# Patient Record
Sex: FEMALE | Race: WHITE | NOT HISPANIC OR LATINO | Employment: FULL TIME | URBAN - METROPOLITAN AREA
[De-identification: names, ages, dates, MRNs, and addresses within clinical notes are randomized per-mention and may not be internally consistent; named-entity substitution may affect disease eponyms.]

---

## 2022-05-17 ENCOUNTER — TELEPHONE (OUTPATIENT)
Dept: ENDOCRINOLOGY | Facility: CLINIC | Age: 42
End: 2022-05-17

## 2022-05-17 ENCOUNTER — LAB (OUTPATIENT)
Dept: LAB | Facility: HOSPITAL | Age: 42
End: 2022-05-17

## 2022-05-17 ENCOUNTER — OFFICE VISIT (OUTPATIENT)
Dept: ENDOCRINOLOGY | Facility: CLINIC | Age: 42
End: 2022-05-17

## 2022-05-17 VITALS
OXYGEN SATURATION: 97 % | WEIGHT: 293 LBS | BODY MASS INDEX: 48.82 KG/M2 | DIASTOLIC BLOOD PRESSURE: 77 MMHG | SYSTOLIC BLOOD PRESSURE: 128 MMHG | HEIGHT: 65 IN | HEART RATE: 82 BPM

## 2022-05-17 DIAGNOSIS — E66.01 CLASS 3 SEVERE OBESITY DUE TO EXCESS CALORIES WITH SERIOUS COMORBIDITY AND BODY MASS INDEX (BMI) OF 50.0 TO 59.9 IN ADULT: ICD-10-CM

## 2022-05-17 DIAGNOSIS — E03.9 ACQUIRED HYPOTHYROIDISM: ICD-10-CM

## 2022-05-17 DIAGNOSIS — E11.65 TYPE 2 DIABETES MELLITUS WITH HYPERGLYCEMIA, WITHOUT LONG-TERM CURRENT USE OF INSULIN: Primary | ICD-10-CM

## 2022-05-17 PROBLEM — G47.33 OSA (OBSTRUCTIVE SLEEP APNEA): Status: ACTIVE | Noted: 2021-08-31

## 2022-05-17 PROBLEM — E28.2 PCOS (POLYCYSTIC OVARIAN SYNDROME): Status: ACTIVE | Noted: 2022-05-17

## 2022-05-17 PROBLEM — I10 ESSENTIAL HYPERTENSION: Status: ACTIVE | Noted: 2021-08-31

## 2022-05-17 PROBLEM — E66.813 CLASS 3 SEVERE OBESITY DUE TO EXCESS CALORIES WITH SERIOUS COMORBIDITY AND BODY MASS INDEX (BMI) OF 50.0 TO 59.9 IN ADULT: Status: ACTIVE | Noted: 2021-08-31

## 2022-05-17 LAB
ALBUMIN UR-MCNC: 2.4 MG/DL
CREAT UR-MCNC: 82.2 MG/DL
EXPIRATION DATE: ABNORMAL
EXPIRATION DATE: NORMAL
GLUCOSE BLDC GLUCOMTR-MCNC: 200 MG/DL (ref 70–130)
HBA1C MFR BLD: 6.5 %
HBA1C MFR BLD: 6.9 %
Lab: ABNORMAL
Lab: NORMAL
MICROALBUMIN/CREAT UR: 29.2 MG/G

## 2022-05-17 PROCEDURE — 82947 ASSAY GLUCOSE BLOOD QUANT: CPT | Performed by: INTERNAL MEDICINE

## 2022-05-17 PROCEDURE — 82570 ASSAY OF URINE CREATININE: CPT | Performed by: INTERNAL MEDICINE

## 2022-05-17 PROCEDURE — 83036 HEMOGLOBIN GLYCOSYLATED A1C: CPT | Performed by: INTERNAL MEDICINE

## 2022-05-17 PROCEDURE — 82043 UR ALBUMIN QUANTITATIVE: CPT | Performed by: INTERNAL MEDICINE

## 2022-05-17 PROCEDURE — 99244 OFF/OP CNSLTJ NEW/EST MOD 40: CPT | Performed by: INTERNAL MEDICINE

## 2022-05-17 RX ORDER — LEVOTHYROXINE SODIUM 0.07 MG/1
75 TABLET ORAL DAILY
COMMUNITY

## 2022-05-17 RX ORDER — FOLIC ACID 1 MG/1
1 TABLET ORAL DAILY
COMMUNITY

## 2022-05-17 RX ORDER — METFORMIN HYDROCHLORIDE 500 MG/1
1000 TABLET, EXTENDED RELEASE ORAL
Qty: 180 TABLET | Refills: 1 | Status: SHIPPED | OUTPATIENT
Start: 2022-05-17 | End: 2022-09-19 | Stop reason: SDUPTHER

## 2022-05-17 RX ORDER — HYDROCHLOROTHIAZIDE 12.5 MG/1
TABLET ORAL
COMMUNITY

## 2022-05-17 RX ORDER — ERGOCALCIFEROL (VITAMIN D2) 10 MCG
400 TABLET ORAL DAILY
COMMUNITY

## 2022-05-17 RX ORDER — METFORMIN HYDROCHLORIDE 500 MG/1
500 TABLET, EXTENDED RELEASE ORAL 2 TIMES DAILY
COMMUNITY
Start: 2022-03-08 | End: 2022-05-17 | Stop reason: SDUPTHER

## 2022-05-17 RX ORDER — RIZATRIPTAN BENZOATE 10 MG/1
10 TABLET ORAL ONCE AS NEEDED
COMMUNITY

## 2022-05-17 RX ORDER — FLUOXETINE HYDROCHLORIDE 40 MG/1
CAPSULE ORAL
COMMUNITY

## 2022-05-17 RX ORDER — LISINOPRIL 30 MG/1
30 TABLET ORAL DAILY
COMMUNITY
Start: 2022-03-08

## 2022-05-17 NOTE — PROGRESS NOTES
Chief Complaint   Patient presents with   • Diabetes        Referring Provider  Lori Hicks, APRN     HPI   Ann Myles is a 41 y.o. female had concerns including Diabetes.    Diabetes was diagnosed recently.  PCOS was diagnosed 2012/2013. Was on metformin for that.  Complications include none.  Last ophtho exam was last year - within a year - Dr. Lloyd in [place].  Current medications for diabetes include metformin 500 mg once daily. Prescribed twice daily but taking only once.  She checks her blood sugar 0 times per day. Has an aversion to pricking her fingers. Insurance coverage isn't great - thinks CGM may not be covered.    Diet:  Breakfast: biscuit and gravy, hashbrowns and eggs, sausage biscuit - works at [employer]  Lunch: cheeseburger, fries  Dinner: eating out often   Drinks: coke zero, at work drinks regular soda - sprite, root beer, or sweet tea  Snacks: fries, chicken strip on occasion, sweets on occasion    Patient also has hypothyroidism and is on levothyroxine 75 mcg daily. Dose was increased earlier this year. PCP was planning to recheck in August.     Snores at night. Hasn't been screened for sleep apnea.   Notes fatigue. Works a lot.     Hasn't met with a nutritionist.     Past Medical History:   Diagnosis Date   • Class 3 severe obesity due to excess calories with serious comorbidity and body mass index (BMI) of 50.0 to 59.9 in adult (HCC)    • Hypothyroidism    • Migraine    • PCOS (polycystic ovarian syndrome)    • Type 2 diabetes mellitus (HCC)      Past Surgical History:   Procedure Laterality Date   • DILATATION AND CURETTAGE     • TUBAL ABDOMINAL LIGATION        Family History   Problem Relation Age of Onset   • Thyroid disease Mother    • Cancer Mother    • Stroke Mother    • Hypertension Father    • Hypertension Maternal Grandmother    • Heart disease Maternal Grandfather    • Hypertension Maternal Grandfather    • Hypertension Paternal Grandmother    • Heart disease Paternal  Grandfather    • Hypertension Paternal Grandfather       Social History     Socioeconomic History   • Marital status: Single   Tobacco Use   • Smoking status: Former Smoker   • Smokeless tobacco: Never Used   Vaping Use   • Vaping Use: Never used   Substance and Sexual Activity   • Alcohol use: Never   • Drug use: Never   • Sexual activity: Defer      Allergies   Allergen Reactions   • Soy Allergy Swelling      Current Outpatient Medications on File Prior to Visit   Medication Sig Dispense Refill   • Cyanocobalamin (Vitamin B 12) 100 MCG lozenge Take  by mouth.     • FLUoxetine (PROzac) 40 MG capsule fluoxetine 40 mg capsule   Take 1 capsule every day by oral route.     • folic acid (FOLVITE) 1 MG tablet Take 1 mg by mouth Daily.     • hydroCHLOROthiazide (HYDRODIURIL) 12.5 MG tablet hydrochlorothiazide 12.5 mg tablet   Take 1 tablet every day by oral route.     • levothyroxine (SYNTHROID, LEVOTHROID) 75 MCG tablet Take 75 mcg by mouth Daily.     • lisinopril (PRINIVIL,ZESTRIL) 30 MG tablet Take 30 mg by mouth Daily.     • rizatriptan (MAXALT) 10 MG tablet Take 10 mg by mouth 1 (One) Time As Needed for Migraine. May repeat in 2 hours if needed     • thiamine (VITAMIN B1) 250 MG tablet Take 250 mg by mouth Daily.     • topiramate (TOPAMAX) 200 MG tablet Take 500 mg by mouth.     • Vitamin D, Cholecalciferol, (CHOLECALCIFEROL) 10 MCG (400 UNIT) tablet Take 400 Units by mouth Daily.     • [DISCONTINUED] metFORMIN ER (GLUCOPHAGE-XR) 500 MG 24 hr tablet Take 500 mg by mouth 2 (Two) Times a Day.       No current facility-administered medications on file prior to visit.        Review of Systems   Constitutional: Positive for fatigue.   HENT: Negative.    Eyes: Negative.    Respiratory: Negative.    Cardiovascular: Negative.    Gastrointestinal: Negative.    Endocrine: Negative.    Genitourinary: Negative.    Musculoskeletal: Negative.    Skin: Negative.    Allergic/Immunologic: Negative.    Neurological: Negative.   "  Hematological: Negative.    Psychiatric/Behavioral: Negative.         /77   Pulse 82   Ht 165.1 cm (65\")   Wt (!) 163 kg (360 lb)   SpO2 97%   BMI 59.91 kg/m²      Physical Exam  Cardiovascular:      Pulses:           Dorsalis pedis pulses are 2+ on the right side and 2+ on the left side.   Feet:      Right foot:      Skin integrity: Skin integrity normal.      Toenail Condition: Right toenails are normal.      Left foot:      Skin integrity: Skin integrity normal.      Toenail Condition: Left toenails are normal.      Comments: Diabetic Foot Exam Performed and Monofilament Test Performed    Monofilament 5/5 bilaterally          Constitutional:  well developed; well nourished  no acute distress  obese - Body mass index is 59.91 kg/m².   ENT/Thyroid: no thyromegaly  no palpable nodules   Eyes: EOM intact  Conjunctiva: clear   Respiratory:  breathing is unlabored  clear to auscultation bilaterally   Cardiovascular:  regular rate and rhythm, S1, S2 normal, no murmur, click, rub or gallop   Chest:  Not performed.   Abdomen: Not performed.   : Not performed.   Musculoskeletal: negative findings:  ROM of all joints is normal, no deformities present   Skin: dry and warm   Neuro: normal without focal findings and mental status, speech normal, alert and oriented x3   Psych: oriented to time, place and person, mood and affect are within normal limits     CMP:  Lab Results   Component Value Date     (H) 08/31/2021    BUN 17 08/31/2021    CREATININE 1.02 08/31/2021    EGFRIFNONA 59 (L) 08/31/2021    EGFRIFAFRI >60 08/31/2021    BCR 17 08/31/2021     08/31/2021    K 4.8 08/31/2021    CO2 24 08/31/2021    CALCIUM 9.3 08/31/2021     HbA1c:  Lab Results   Component Value Date    HGBA1C 6.9 05/17/2022     Glucose:  Lab Results   Component Value Date    POCGLU 200 (A) 05/17/2022     Assessment and Plan    Diagnoses and all orders for this visit:    1. Type 2 diabetes mellitus with hyperglycemia, without " long-term current use of insulin ([facility]) (Primary)  Uncontrolled with hyperglycemia.  A1c has trended up to 6.9.  Increase metformin to 1000 mg once daily in the AM.  Dietary modifications are necessary and were discussed.  Eliminate regular sweetened beverages, decrease intake of fast food.  Discussed the option to consider GLP-1 agonists though patient's insurance coverage is inadequate and she has much room for improvement in lifestyle.  Will defer at this time.  Labs requested from PCP and referring provider.  Monofilament was checked today and was normal.  Ophtho exam updated yearly and patient will request the report be sent here.  -     POC Glucose, Blood  -     POC Glycosylated Hemoglobin (Hb A1C)  -     metFORMIN ER (GLUCOPHAGE-XR) 500 MG 24 hr tablet; Take 2 tablets by mouth Daily With Breakfast.  Dispense: 180 tablet; Refill: 1  -     Microalbumin / Creatinine Urine Ratio - Urine, Clean Catch    2. Class 3 severe obesity due to excess calories with serious comorbidity and body mass index (BMI) of 50.0 to 59.9 in adult ([facility])  Weight loss is necessary for overall health and diabetes management.  Current BMI 59.9.  Discussed dietary modifications as above a nutritionist was consulted.  -     Ambulatory Referral to Nutrition Services    3. Acquired hypothyroidism  Clinically euthyroid on levothyroxine 75 mcg daily.  Requested labs from PCPs office for review.  Dose was increased earlier this year and will have repeat levels in August.      Return in about 4 months (around 9/17/2022) for next scheduled follow up. The patient was instructed to contact the clinic with any interval questions or concerns.    Marguerite Farah, DO   Endocrinologist    Please note that portions of this note were completed with a voice recognition program.

## 2022-05-18 DIAGNOSIS — E66.01 CLASS 3 SEVERE OBESITY DUE TO EXCESS CALORIES WITH SERIOUS COMORBIDITY AND BODY MASS INDEX (BMI) OF 50.0 TO 59.9 IN ADULT: ICD-10-CM

## 2022-05-18 DIAGNOSIS — E11.65 TYPE 2 DIABETES MELLITUS WITH HYPERGLYCEMIA, WITHOUT LONG-TERM CURRENT USE OF INSULIN: Primary | ICD-10-CM

## 2022-09-19 ENCOUNTER — OFFICE VISIT (OUTPATIENT)
Dept: ENDOCRINOLOGY | Facility: CLINIC | Age: 42
End: 2022-09-19

## 2022-09-19 VITALS
WEIGHT: 293 LBS | HEIGHT: 65 IN | OXYGEN SATURATION: 97 % | HEART RATE: 87 BPM | BODY MASS INDEX: 48.82 KG/M2 | SYSTOLIC BLOOD PRESSURE: 124 MMHG | DIASTOLIC BLOOD PRESSURE: 79 MMHG

## 2022-09-19 DIAGNOSIS — E11.65 TYPE 2 DIABETES MELLITUS WITH HYPERGLYCEMIA, WITHOUT LONG-TERM CURRENT USE OF INSULIN: Primary | ICD-10-CM

## 2022-09-19 DIAGNOSIS — E66.01 CLASS 3 SEVERE OBESITY DUE TO EXCESS CALORIES WITH SERIOUS COMORBIDITY AND BODY MASS INDEX (BMI) OF 50.0 TO 59.9 IN ADULT: ICD-10-CM

## 2022-09-19 LAB
EXPIRATION DATE: NORMAL
EXPIRATION DATE: NORMAL
GLUCOSE BLDC GLUCOMTR-MCNC: 130 MG/DL (ref 70–130)
HBA1C MFR BLD: 6.7 %
Lab: NORMAL
Lab: NORMAL

## 2022-09-19 PROCEDURE — 82947 ASSAY GLUCOSE BLOOD QUANT: CPT | Performed by: INTERNAL MEDICINE

## 2022-09-19 PROCEDURE — 83036 HEMOGLOBIN GLYCOSYLATED A1C: CPT | Performed by: INTERNAL MEDICINE

## 2022-09-19 PROCEDURE — 3044F HG A1C LEVEL LT 7.0%: CPT | Performed by: INTERNAL MEDICINE

## 2022-09-19 PROCEDURE — 99214 OFFICE O/P EST MOD 30 MIN: CPT | Performed by: INTERNAL MEDICINE

## 2022-09-19 RX ORDER — METFORMIN HYDROCHLORIDE 500 MG/1
1000 TABLET, EXTENDED RELEASE ORAL
Qty: 180 TABLET | Refills: 1 | Status: SHIPPED | OUTPATIENT
Start: 2022-09-19

## 2022-09-19 NOTE — PROGRESS NOTES
"Chief Complaint   Patient presents with   • Diabetes          HPI   Ann Myles is a 42 y.o. female had concerns including Diabetes.    She is checking blood sugars 0 times per day.  Current medications for diabetes include metformin 1000 mg daily in the morning. Tolerating without issue.     Has changed her diet - eating less fast food, no regular soda. All diet sodas now.     Is getting > 10,000 steps per day.     Is due for labs. Is between insurances so pending labs once insurance is started.    Is on levothyroxine 75 mcg daily.     The following portions of the patient's history were reviewed and updated as appropriate: allergies, current medications, past family history, past medical history, past social history, past surgical history and problem list.      Review of Systems   Constitutional: Positive for activity change and appetite change.   Endocrine: Negative.         Physical Exam  Vitals reviewed.   Constitutional:       Appearance: Normal appearance. She is obese.      Comments: Body mass index is 57.91 kg/m².   Cardiovascular:      Rate and Rhythm: Normal rate.   Pulmonary:      Effort: Pulmonary effort is normal.   Neurological:      General: No focal deficit present.      Mental Status: She is alert. Mental status is at baseline.   Psychiatric:         Mood and Affect: Mood normal.         Behavior: Behavior normal.        /79   Pulse 87   Ht 165.1 cm (65\")   Wt (!) 158 kg (348 lb)   SpO2 97%   BMI 57.91 kg/m²      Labs and imaging    CMP:  Lab Results   Component Value Date     (H) 08/31/2021    BUN 17 08/31/2021    CREATININE 1.02 08/31/2021    EGFRIFNONA 59 (L) 08/31/2021    EGFRIFAFRI >60 08/31/2021    BCR 17 08/31/2021     08/31/2021    K 4.8 08/31/2021    CO2 24 08/31/2021    CALCIUM 9.3 08/31/2021     HbA1c:  Lab Results   Component Value Date    HGBA1C 6.7 09/19/2022    HGBA1C 6.9 05/17/2022     Glucose:    Lab Results   Component Value Date    POCGLU 130 09/19/2022 "     Microalbumin:  Lab Results   Component Value Date    MICHAEL 29.2 05/17/2022       Assessment and plan  Diagnoses and all orders for this visit:    1. Type 2 diabetes mellitus without complications, without long-term current use of insulin ([facility]) (Primary)  Controlled with A1c down to 6.7.  No complications related to diabetes.  Improved since dietary modifications, increasing metformin, weight loss.  Continue metformin 1000 mg every morning.  Continue dietary modifications, increase exercise as tolerated.  Labs are up-to-date and will be rechecked by PCP.  Urine microalbumin up-to-date from May.  Monofilament up-to-date from May.  Ophtho exam should be updated yearly.  -     POC Glucose, Blood  -     POC Glycosylated Hemoglobin (Hb A1C)  -     metFORMIN ER (GLUCOPHAGE-XR) 500 MG 24 hr tablet; Take 2 tablets by mouth Daily With Breakfast.  Dispense: 180 tablet; Refill: 1    2. Class 3 severe obesity due to excess calories with serious comorbidity and body mass index (BMI) of 50.0 to 59.9 in adult ([facility])  BMI down to 57.9 with 12 pound weight loss since her last visit here.  Encouraged continued efforts at weight loss.    A1c is improving, patient now experiencing weight loss.  Follow-up with PCP for now.  We are happy to see the patient back on an as-needed basis.       Return as needed. The patient was instructed to contact the clinic with any interval questions or concerns.    Marguerite Farah, DO   Endocrinologist    Please note that portions of this note were completed with a voice recognition program.

## 2025-02-14 ENCOUNTER — HOSPITAL ENCOUNTER (OUTPATIENT)
Dept: GENERAL RADIOLOGY | Facility: HOSPITAL | Age: 45
Discharge: HOME | End: 2025-02-14
Admitting: NURSE PRACTITIONER
Payer: COMMERCIAL

## 2025-02-14 ENCOUNTER — TRANSCRIBE ORDERS (OUTPATIENT)
Dept: GENERAL RADIOLOGY | Facility: HOSPITAL | Age: 45
End: 2025-02-14
Payer: COMMERCIAL

## 2025-02-14 DIAGNOSIS — R05.9 COUGH, UNSPECIFIED TYPE: ICD-10-CM

## 2025-02-14 DIAGNOSIS — R05.9 COUGH, UNSPECIFIED TYPE: Primary | ICD-10-CM

## 2025-02-14 PROCEDURE — 71046 X-RAY EXAM CHEST 2 VIEWS: CPT
